# Patient Record
Sex: MALE | Race: BLACK OR AFRICAN AMERICAN | NOT HISPANIC OR LATINO | Employment: UNEMPLOYED | URBAN - NONMETROPOLITAN AREA
[De-identification: names, ages, dates, MRNs, and addresses within clinical notes are randomized per-mention and may not be internally consistent; named-entity substitution may affect disease eponyms.]

---
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
## 2018-02-27 DIAGNOSIS — J30.89 PERENNIAL ALLERGIC RHINITIS: ICD-10-CM

## 2018-02-28 RX ORDER — ALBUTEROL SULFATE 90 UG/1
AEROSOL, METERED RESPIRATORY (INHALATION)
Qty: 18 INHALER | Refills: 0 | Status: SHIPPED | OUTPATIENT
Start: 2018-02-28

## 2022-06-27 PROBLEM — E55.9 VITAMIN D INSUFFICIENCY: Status: ACTIVE | Noted: 2021-09-02

## 2022-06-27 PROBLEM — F41.9 ANXIETY: Status: ACTIVE | Noted: 2021-09-02

## 2022-06-27 PROBLEM — F90.9 ATTENTION DEFICIT HYPERACTIVITY DISORDER (ADHD): Status: ACTIVE | Noted: 2020-07-13

## 2022-06-27 PROBLEM — I10 ESSENTIAL HYPERTENSION: Status: ACTIVE | Noted: 2020-07-13

## 2022-06-27 PROBLEM — J30.9 ALLERGIC RHINITIS: Status: ACTIVE | Noted: 2020-07-13

## 2022-06-27 PROBLEM — Z72.0 TOBACCO ABUSE: Status: ACTIVE | Noted: 2021-09-02

## 2022-06-27 PROBLEM — Z71.6 TOBACCO ABUSE COUNSELING: Status: ACTIVE | Noted: 2021-09-02

## 2022-06-27 PROBLEM — R56.9 SEIZURES (HCC): Status: ACTIVE | Noted: 2021-07-20

## 2022-06-27 PROBLEM — F33.0 MILD EPISODE OF RECURRENT MAJOR DEPRESSIVE DISORDER (HCC): Status: ACTIVE | Noted: 2021-09-02

## 2022-06-27 PROBLEM — R73.09 ELEVATED HEMOGLOBIN A1C: Status: ACTIVE | Noted: 2021-07-20

## 2022-06-27 PROBLEM — R73.9 HYPERGLYCEMIA: Status: ACTIVE | Noted: 2021-07-20
